# Patient Record
(demographics unavailable — no encounter records)

---

## 2025-06-25 NOTE — PHYSICAL EXAM
[Appropriately responsive] : appropriately responsive [Alert] : alert [No Acute Distress] : no acute distress [Soft] : soft [Non-tender] : non-tender [Non-distended] : non-distended [No Lesions] : no lesions [No Mass] : no mass [Oriented x3] : oriented x3 [FreeTextEntry5] :  Respirations even, unlabored. No dyspnea.  [FreeTextEntry6] : deferred [FreeTextEntry7] : rounded [FreeTextEntry1] : Labia/Clitoris: Normal, no lesions. Vagina: moderate amount of brown tinged /bloody discharge, no clots; no lesions visible or palpable.  Cervix: lsmooth, pink, no lesions. No cervical motion tenderness. Uterus: limited exam, enlarged, mid-position, mobile, nontender. Adnexa: No palpable adnexal masses, nontender bilaterally.

## 2025-06-25 NOTE — DISCUSSION/SUMMARY
[FreeTextEntry1] : Well appearing female is here for c/o prolonged vaginal bleeding since the beginning of May. Bleeding comes and goes, no active bleeding noted on exam. UCG is negative in office.   Recommend pelvic ultrasound for further evaluation Labs drawn in office today as well. Reviewed potential treatment options for irregular bleeding/periods. Pt prefers to not use Provera again due to severe pelvic cramping that occurred the last time she took it. Reviewed OCP's, patch, ring, IUD's (pt is not interested in IUD at this time). Patient will wait for test results first.   Also discussed obtaining previous records from [facility] due to previous hx of abnormal bleeding 2 years ago.  Bleeding precautions reviewed   RTO as directed for routine gyn exam and PRN

## 2025-06-25 NOTE — HISTORY OF PRESENT ILLNESS
[Patient reported mammogram was normal] : Patient reported mammogram was normal [Irregular Cycle Intervals] : are  irregular [Monogamous (Male Partner)] : is monogamous with a male partner [Y] : Patient uses contraception [Withdrawal] : uses withdrawal [Mammogramdate] : 2023 [TextBox_19] : hx of right breast biopsy (benign mass) [PapSmeardate] : 1/2024 [LMPDate] : 5/9/25 [PGxTotal] : 1 [PGxPremature] : 1 [[facility] [FreeTextEntry1] :  x 1